# Patient Record
Sex: FEMALE | Race: WHITE | NOT HISPANIC OR LATINO | URBAN - METROPOLITAN AREA
[De-identification: names, ages, dates, MRNs, and addresses within clinical notes are randomized per-mention and may not be internally consistent; named-entity substitution may affect disease eponyms.]

---

## 2017-09-11 ASSESSMENT — MIFFLIN-ST. JEOR: SCORE: 876.41

## 2017-09-12 ENCOUNTER — SURGERY - HEALTHEAST (OUTPATIENT)
Dept: SURGERY | Facility: CLINIC | Age: 82
End: 2017-09-12

## 2017-09-12 ENCOUNTER — ANESTHESIA - HEALTHEAST (OUTPATIENT)
Dept: SURGERY | Facility: CLINIC | Age: 82
End: 2017-09-12

## 2017-09-12 ASSESSMENT — MIFFLIN-ST. JEOR: SCORE: 892.57

## 2017-09-13 ASSESSMENT — MIFFLIN-ST. JEOR: SCORE: 917.06

## 2017-09-19 ENCOUNTER — OFFICE VISIT - HEALTHEAST (OUTPATIENT)
Dept: GERIATRICS | Facility: CLINIC | Age: 82
End: 2017-09-19

## 2017-09-19 DIAGNOSIS — E66.9 DIABETES MELLITUS TYPE 2 IN OBESE: ICD-10-CM

## 2017-09-19 DIAGNOSIS — S72.001D CLOSED FRACTURE OF RIGHT HIP WITH ROUTINE HEALING: ICD-10-CM

## 2017-09-19 DIAGNOSIS — F01.50 VASCULAR DEMENTIA WITHOUT BEHAVIORAL DISTURBANCE (H): ICD-10-CM

## 2017-09-19 DIAGNOSIS — I48.0 PAROXYSMAL ATRIAL FIBRILLATION (H): ICD-10-CM

## 2017-09-19 DIAGNOSIS — H17.12 CORNEAL OPACITY, CENTRAL, LEFT: ICD-10-CM

## 2017-09-19 DIAGNOSIS — I25.2 H/O NON-ST ELEVATION MYOCARDIAL INFARCTION (NSTEMI): ICD-10-CM

## 2017-09-19 DIAGNOSIS — I44.7 LBBB (LEFT BUNDLE BRANCH BLOCK): ICD-10-CM

## 2017-09-19 DIAGNOSIS — Z96.641 HISTORY OF RIGHT HIP HEMIARTHROPLASTY: ICD-10-CM

## 2017-09-19 DIAGNOSIS — E11.69 DIABETES MELLITUS TYPE 2 IN OBESE: ICD-10-CM

## 2017-09-19 DIAGNOSIS — I50.22 CHRONIC SYSTOLIC HEART FAILURE (H): ICD-10-CM

## 2017-09-21 ENCOUNTER — OFFICE VISIT - HEALTHEAST (OUTPATIENT)
Dept: GERIATRICS | Facility: CLINIC | Age: 82
End: 2017-09-21

## 2017-09-21 DIAGNOSIS — F01.50 VASCULAR DEMENTIA WITHOUT BEHAVIORAL DISTURBANCE (H): ICD-10-CM

## 2017-09-21 DIAGNOSIS — S72.001D CLOSED FRACTURE OF RIGHT HIP WITH ROUTINE HEALING: ICD-10-CM

## 2017-09-21 DIAGNOSIS — E11.69 DIABETES MELLITUS TYPE 2 IN OBESE: ICD-10-CM

## 2017-09-21 DIAGNOSIS — R13.10 DYSPHAGIA: ICD-10-CM

## 2017-09-21 DIAGNOSIS — I50.9 CONGESTIVE HEART FAILURE (H): ICD-10-CM

## 2017-09-21 DIAGNOSIS — R91.1 LESION OF LUNG: ICD-10-CM

## 2017-09-21 DIAGNOSIS — I25.10 CAD (CORONARY ARTERY DISEASE): ICD-10-CM

## 2017-09-21 DIAGNOSIS — E66.9 DIABETES MELLITUS TYPE 2 IN OBESE: ICD-10-CM

## 2017-09-21 DIAGNOSIS — Z96.641 HISTORY OF RIGHT HIP HEMIARTHROPLASTY: ICD-10-CM

## 2017-09-21 DIAGNOSIS — I10 BENIGN ESSENTIAL HTN: ICD-10-CM

## 2017-09-21 DIAGNOSIS — N18.3 CRD (CHRONIC RENAL DISEASE), STAGE 3 (MODERATE): ICD-10-CM

## 2017-09-21 DIAGNOSIS — D64.9 ANEMIA: ICD-10-CM

## 2017-09-21 DIAGNOSIS — I48.0 PAROXYSMAL ATRIAL FIBRILLATION (H): ICD-10-CM

## 2017-09-26 ENCOUNTER — OFFICE VISIT - HEALTHEAST (OUTPATIENT)
Dept: GERIATRICS | Facility: CLINIC | Age: 82
End: 2017-09-26

## 2017-09-26 DIAGNOSIS — I25.2 H/O NON-ST ELEVATION MYOCARDIAL INFARCTION (NSTEMI): ICD-10-CM

## 2017-09-26 DIAGNOSIS — Z96.641 HISTORY OF RIGHT HIP HEMIARTHROPLASTY: ICD-10-CM

## 2017-09-26 DIAGNOSIS — I48.0 PAROXYSMAL ATRIAL FIBRILLATION (H): ICD-10-CM

## 2017-09-26 DIAGNOSIS — N18.4 CHRONIC KIDNEY DISEASE, STAGE IV (SEVERE) (H): ICD-10-CM

## 2017-09-26 DIAGNOSIS — I50.22 CHRONIC SYSTOLIC HEART FAILURE (H): ICD-10-CM

## 2017-09-26 DIAGNOSIS — S72.001D CLOSED FRACTURE OF RIGHT HIP WITH ROUTINE HEALING: ICD-10-CM

## 2017-09-26 DIAGNOSIS — E11.69 DIABETES MELLITUS TYPE 2 IN OBESE: ICD-10-CM

## 2017-09-26 DIAGNOSIS — I10 BENIGN ESSENTIAL HYPERTENSION: ICD-10-CM

## 2017-09-26 DIAGNOSIS — F01.50 VASCULAR DEMENTIA WITHOUT BEHAVIORAL DISTURBANCE (H): ICD-10-CM

## 2017-09-26 DIAGNOSIS — I44.7 LBBB (LEFT BUNDLE BRANCH BLOCK): ICD-10-CM

## 2017-09-26 DIAGNOSIS — E66.9 DIABETES MELLITUS TYPE 2 IN OBESE: ICD-10-CM

## 2017-09-28 ENCOUNTER — OFFICE VISIT - HEALTHEAST (OUTPATIENT)
Dept: GERIATRICS | Facility: CLINIC | Age: 82
End: 2017-09-28

## 2017-09-28 DIAGNOSIS — N18.3 CRD (CHRONIC RENAL DISEASE), STAGE 3 (MODERATE): ICD-10-CM

## 2017-09-28 DIAGNOSIS — E11.69 DIABETES MELLITUS TYPE 2 IN OBESE: ICD-10-CM

## 2017-09-28 DIAGNOSIS — E66.9 DIABETES MELLITUS TYPE 2 IN OBESE: ICD-10-CM

## 2017-09-28 DIAGNOSIS — I50.22 CHRONIC SYSTOLIC HEART FAILURE (H): ICD-10-CM

## 2017-09-28 DIAGNOSIS — I48.0 PAROXYSMAL ATRIAL FIBRILLATION (H): ICD-10-CM

## 2017-09-28 DIAGNOSIS — I25.10 CAD (CORONARY ARTERY DISEASE): ICD-10-CM

## 2017-09-28 DIAGNOSIS — S72.001D CLOSED FRACTURE OF RIGHT HIP WITH ROUTINE HEALING: ICD-10-CM

## 2017-09-28 DIAGNOSIS — Z96.641 HISTORY OF RIGHT HIP HEMIARTHROPLASTY: ICD-10-CM

## 2017-09-28 DIAGNOSIS — R13.10 DYSPHAGIA: ICD-10-CM

## 2017-09-28 DIAGNOSIS — D64.9 ANEMIA: ICD-10-CM

## 2017-09-28 DIAGNOSIS — F01.50 VASCULAR DEMENTIA WITHOUT BEHAVIORAL DISTURBANCE (H): ICD-10-CM

## 2017-09-28 DIAGNOSIS — I10 BENIGN ESSENTIAL HYPERTENSION: ICD-10-CM

## 2017-09-28 DIAGNOSIS — R91.1 LESION OF LUNG: ICD-10-CM

## 2017-10-02 ENCOUNTER — AMBULATORY - HEALTHEAST (OUTPATIENT)
Dept: GERIATRICS | Facility: CLINIC | Age: 82
End: 2017-10-02

## 2021-05-25 ENCOUNTER — RECORDS - HEALTHEAST (OUTPATIENT)
Dept: ADMINISTRATIVE | Facility: CLINIC | Age: 86
End: 2021-05-25

## 2021-05-26 ENCOUNTER — RECORDS - HEALTHEAST (OUTPATIENT)
Dept: ADMINISTRATIVE | Facility: CLINIC | Age: 86
End: 2021-05-26

## 2021-05-27 ENCOUNTER — RECORDS - HEALTHEAST (OUTPATIENT)
Dept: ADMINISTRATIVE | Facility: CLINIC | Age: 86
End: 2021-05-27

## 2021-05-28 ENCOUNTER — RECORDS - HEALTHEAST (OUTPATIENT)
Dept: ADMINISTRATIVE | Facility: CLINIC | Age: 86
End: 2021-05-28

## 2021-05-29 ENCOUNTER — RECORDS - HEALTHEAST (OUTPATIENT)
Dept: ADMINISTRATIVE | Facility: CLINIC | Age: 86
End: 2021-05-29

## 2021-05-30 ENCOUNTER — RECORDS - HEALTHEAST (OUTPATIENT)
Dept: ADMINISTRATIVE | Facility: CLINIC | Age: 86
End: 2021-05-30

## 2021-05-31 ENCOUNTER — RECORDS - HEALTHEAST (OUTPATIENT)
Dept: ADMINISTRATIVE | Facility: CLINIC | Age: 86
End: 2021-05-31

## 2021-05-31 VITALS — WEIGHT: 130.1 LBS | BODY MASS INDEX: 25.41 KG/M2

## 2021-05-31 VITALS — BODY MASS INDEX: 26 KG/M2 | WEIGHT: 132.4 LBS | HEIGHT: 60 IN

## 2021-05-31 VITALS — BODY MASS INDEX: 24.94 KG/M2 | WEIGHT: 127.7 LBS

## 2021-06-01 ENCOUNTER — RECORDS - HEALTHEAST (OUTPATIENT)
Dept: ADMINISTRATIVE | Facility: CLINIC | Age: 86
End: 2021-06-01

## 2021-06-02 ENCOUNTER — RECORDS - HEALTHEAST (OUTPATIENT)
Dept: ADMINISTRATIVE | Facility: CLINIC | Age: 86
End: 2021-06-02

## 2021-06-03 ENCOUNTER — RECORDS - HEALTHEAST (OUTPATIENT)
Dept: ADMINISTRATIVE | Facility: CLINIC | Age: 86
End: 2021-06-03

## 2021-06-09 ENCOUNTER — RECORDS - HEALTHEAST (OUTPATIENT)
Dept: ADMINISTRATIVE | Facility: CLINIC | Age: 86
End: 2021-06-09

## 2021-06-12 NOTE — ANESTHESIA PROCEDURE NOTES
Spinal Block    Patient location during procedure: OR  Start time: 9/12/2017 7:55 PM  End time: 9/12/2017 7:58 PM    Staffing:  Performing  Anesthesiologist: TSACIA RAYA    Preanesthetic Checklist  Completed: patient identified, risks, benefits, and alternatives discussed, timeout performed, consent obtained, at patient's request, airway assessed, oxygen available, suction available, emergency drugs available and hand hygiene performed  Spinal Block  Patient position: right lateral decubitus  Prep: ChloraPrep  Patient monitoring: continuous pulse ox  Approach: midline  Location: L3-4  Injection technique: single-shot  Needle type: pencil-tip   Needle gauge: 24 G    Assessment  Sensory level: T10

## 2021-06-12 NOTE — ANESTHESIA POSTPROCEDURE EVALUATION
Patient: Remedios Nazario  RIGHT HIP BIPOLAR HEMIARTHROPLASTY  Anesthesia type: spinal    Patient location: PACU  Last vitals:   Vitals:    09/12/17 2250   BP: 166/83   Pulse: 93   Resp: 16   Temp: 36.6  C (97.8  F)   SpO2: 95%     Post vital signs: stable  Level of consciousness: awake and responds to simple questions  Post-anesthesia pain: pain controlled  Post-anesthesia nausea and vomiting: no  Pulmonary: unassisted, return to baseline  Cardiovascular: stable and blood pressure at baseline  Hydration: adequate  Anesthetic events: no    QCDR Measures:  ASA# 11 - Cecilia-op Cardiac Arrest: ASA11B - Patient did NOT experience unanticipated cardiac arrest  ASA# 12 - Cecilia-op Mortality Rate: ASA12B - Patient did NOT die  ASA# 13 - PACU Re-Intubation Rate: ASA13B - Patient did NOT require a new airway mgmt  ASA# 10 - Composite Anes Safety: ASA10A - No serious adverse event    Additional Notes:

## 2021-06-12 NOTE — ANESTHESIA CARE TRANSFER NOTE
Last vitals:   Vitals:    09/12/17 1810   BP: 106/56   Pulse: 82   Resp: 18   Temp: 36.9  C (98.4  F)   SpO2: 95%     Patient's level of consciousness is drowsy  Spontaneous respirations: yes  Maintains airway independently: yes  Dentition unchanged: yes  Oropharynx: oropharynx clear of all foreign objects    QCDR Measures:  ASA# 20 - Surgical No Data Recorded  PQRS# 430 - Adult PONV Prevention: 4558F-8P - Pt did NOT receive => 2 anti-emetic agents  ASA# 8 - Peds PONV Prevention: NA - Not pediatric patient, not GA or 2 or more risk factors NOT present  PQRS# 424 - Cecilia-op Temp Management: 4559F - At least one body temp DOCUMENTED => 35.5C or 95.9F within required timeframe  PQRS# 426 - PACU Transfer Protocol: - Transfer of care checklist used  ASA# 14 - Acute Post-op Pain: ASA14B - Patient did NOT experience pain >= 7 out of 10

## 2021-06-12 NOTE — ANESTHESIA PREPROCEDURE EVALUATION
Anesthesia Evaluation      Patient summary reviewed   History of anesthetic complications     Airway   Neck ROM: full   Pulmonary - normal exam   (+) pneumonia,                          Cardiovascular - normal exam  (+) CAD, dysrhythmias, CHF, ,      Neuro/Psych    (+) CVA ,     Endo/Other    (+) diabetes mellitus,      GI/Hepatic/Renal    (+)   chronic renal disease,           Dental    (+) lower dentures and upper dentures                       Anesthesia Plan  Planned anesthetic: spinal    ASA 3     Anesthetic plan and risks discussed with: patient    Post-op plan: routine recovery

## 2021-06-13 NOTE — PROGRESS NOTES
[facility]    DATE: 2017  NAME: Remedios Nazario  : 2/10/1926           MR# [id number]     CODE STATUS:  DNR  VISIT TYPE: Admission  FACILITY: [facility] [[id number]]    ROOM: 416  PRIMARY CARE PROVIDER: Loni Canada PA-C Phone: [phone] Fax:[phone]    History of Present Illness:   Remedios Nazario is a 89 y.o. female with With a right hip surgical neck fracture as a result of an unobserved fall in memory care. She had subsequent 17 right hemiarthroplasty. Records are completed from nurses notes as patient is very unclear as to any history regarding falls or breaks or  Even previous residence    Past Medical History:  Past Medical History:   Diagnosis Date     Abdominal aortic aneurysm      Anemia      Atrophic kidney     Left     Back fracture 2015     Bladder cancer     with ileostomy     CAD (coronary artery disease)      Carotid stenosis     and vertebrobasilar insufficiency     CHF (congestive heart failure)      CKD (chronic kidney disease)      Corneal opacity, central, left      CVA (cerebral vascular accident)      Dementia      Diabetes mellitus      Dysphagia     mild     Encephalopathy      Fracture     L2 decompression     Glaucoma      Hyperlipidemia      Hypertension      Left bundle branch block     chronic     MRSA (methicillin resistant Staphylococcus aureus)      NSTEMI (non-ST elevated myocardial infarction)      Osteomyelitis     and diskitis and C5-C6     Pancreatitis      Seizure disorder      Shingles 2/7/15     Thoracic aortic aneurysm      UTI (urinary tract infection)        Past Surgical History:  Past Surgical History:   Procedure Laterality Date     CHOLECYSTECTOMY       CORONARY ARTERY BYPASS GRAFT       HYSTERECTOMY       left TKA       IA PARTIAL HIP REPLACEMENT Right 2017    Procedure: RIGHT HIP BIPOLAR HEMIARTHROPLASTY;  Surgeon: Wesly Finch MD;  Location: [facility];  Service:  Orthopedics     SPINAL FUSION       TONSILLECTOMY         Allergies:  Allergies   Allergen Reactions     Codeine Itching     Darvon [Propoxyphene] Itching     Demerol [Meperidine] Itching     Dilaudid [Hydromorphone]      From [facility] records     Doxycycline Calcium      Please avoid tetracycines in this patient as we believe that this probably caused pancreatitis      Oxycodone      hallucinations       Social History:  Social History     Social History     Marital status:      Spouse name: N/A     Number of children: N/A     Years of education: N/A     Occupational History     Not on file.     Social History Main Topics     Smoking status: Former Smoker     Quit date: 9/21/1996     Smokeless tobacco: Not on file     Alcohol use No     Drug use: No     Sexual activity: No     Other Topics Concern     Not on file     Social History Narrative    Lives in memory care unit.  She doesn't remember where she livs but is sure it is at home   9/2017       Family History:  Family History   Problem Relation Age of Onset     Diabetes Mother      Heart disease Mother        Current Medications:  Current Outpatient Prescriptions   Medication Sig     acetaminophen (TYLENOL) 500 MG tablet Take 2 tablets (1,000 mg total) by mouth 3 (three) times a day.     atorvastatin (LIPITOR) 40 MG tablet Take 1 tablet (40 mg total) by mouth bedtime.     brimonidine-timolol (COMBIGAN) 0.2-0.5 % ophthalmic solution Administer 1 drop to the right eye 2 (two) times a day.     bumetanide (BUMEX) 0.5 MG tablet Take 0.5 mg by mouth daily.     calcium-vitamin D 500 mg(1,250mg) -200 unit per tablet Take 1 tablet by mouth 2 (two) times a day with meals.      clopidogrel (PLAVIX) 75 mg tablet Take 75 mg by mouth at bedtime.      donepezil (ARICEPT) 5 MG tablet Take 5 mg by mouth bedtime.     ferrous sulfate 325 (65 FE) MG tablet Take 1 tablet by mouth daily with breakfast.     glimepiride (AMARYL) 1 MG tablet Take 1 mg by mouth daily  before breakfast.     magnesium hydroxide (MAGNESIUM HYDROXIDE) 400 mg/5 mL Susp suspension Take 30 mL by mouth at bedtime.      meclizine (ANTIVERT) 25 mg tablet Take 25 mg by mouth 3 (three) times a day as needed.      melatonin 3 mg Tab tablet Take 3 mg by mouth at bedtime.      metoprolol tartrate (LOPRESSOR) 25 MG tablet Take 25 mg by mouth 2 (two) times a day.     nystatin (MYCOSTATIN) powder Apply 1 application topically as needed (with ostomy bag changes).     OLANZapine (ZYPREXA) 2.5 MG tablet Take 2.5 mg by mouth bedtime.      omeprazole (PRILOSEC) 20 MG capsule Take 20 mg by mouth daily before breakfast.     polyethylene glycol (MIRALAX) 17 gram packet Take 1 packet (17 g total) by mouth daily.     polyvinyl alcohol (LIQUIFILM TEARS) 1.4 % ophthalmic solution Administer 1-2 drops to both eyes 2 (two) times a day.      senna (SENOKOT) 8.6 mg tablet Take 1 tablet by mouth at bedtime.      sodium chloride (OCEAN) 0.65 % nasal spray 1-2 sprays into each nostril 4 (four) times a day.      travoprost (TRAVATAN Z) 0.004 % Drop ophthalmic drops Administer 1 drop to the right eye at bedtime.      trimethoprim (TRIMPEX) 100 mg tablet Take 100 mg by mouth every evening.     venlafaxine (EFFEXOR-XR) 75 MG 24 hr capsule Take 75 mg by mouth daily.       Review of Systems:  History obtained from chart review and the patient Although patient is very inadequate as historian and primary relapse and records as necessary  General ROS: positive for  - fatigue  negative for - chills or fever  Psychological ROS: positive for - memory difficulties  Ophthalmic ROS: negative for - decreased vision, loss of vision or In her right good eye although the left has a dense corneal scar prohibiting vision centrally  ENT ROS: negative for - nasal discharge or sore throat  Breast ROS: negative for breast lumps  Respiratory ROS: no cough, shortness of breath, or wheezing  Cardiovascular ROS: no chest pain or dyspnea on  exertion  Gastrointestinal ROS: no abdominal pain, change in bowel habits, or black or bloody stools  Genito-Urinary ROS: no dysuria, trouble voiding, or hematuria  Musculoskeletal ROS: When I ask you to move it she notes her hips move the same on both sides not really willing to acknowledge pain even in the area of the hemiarthroplasty  Neurological ROS: positive for - memory loss  Dermatological ROS: negative       Physical Examination:  /75  Pulse 79  Temp 97.5  F (36.4  C)  Resp 18  SpO2 95%  General appearance: alert, appears stated age, cooperative, distracted, fatigued, no distress, mildly obese and slowed mentation  Head: Normocephalic, without obvious abnormality, atraumatic  Eyes:Left cornea is opaque with scar right cornea intact  Ears: normal TM's and external ear canals both ears  Nose: Nares normal. Septum midline. Mucosa normal. No drainage or sinus tenderness.  Throat: lips, mucosa, and tongue normal; teeth and gums normal  Neck: no adenopathy, no carotid bruit, no JVD, supple, symmetrical, trachea midline and thyroid not enlarged, symmetric, no tenderness/mass/nodules  Back: symmetric, no curvature. ROM normal. No CVA tenderness.  Lungs: clear to auscultation bilaterally  Breasts: normal appearance, no masses or tenderness  Heart: regular rate and rhythm, S1, S2 normal, no murmur, click, rub or gallop  Abdomen: soft, non-tender; bowel sounds normal; no masses,  no organomegaly  Extremities: she shows fairly normal at least symmetrical function of both hips despite slight swelling in the right hip and  lower extremity  Pulses: 2+ and symmetric  Skin: Skin color, texture, turgor normal. No rashes or lesions  Neurologic: Mental status: oriented only to person but verbally cooperative and pleasant and well spoken  Motor:on this exam symmetrical strength and tone despite known right hip surgery       Impression:  Remedios Nazario is a 89 y.o. female with Right hip hemiarthroplasty 9/12/17 after  fall    1. Closed fracture of right hip with routine healing     2. History of right hip hemiarthroplasty     3. Diabetes mellitus type 2 in obese     4. Chronic systolic heart failure     5. Vascular dementia without behavioral disturbance     6. Paroxysmal atrial fibrillation     7. H/O non-ST elevation myocardial infarction (NSTEMI)     8. LBBB (left bundle branch block)     9. Corneal opacity, central, left           Plan: Work with her for maximum rehabilitation to allow her to return to her memory care    Electronically signed by: Shane Hawkins Sr., MD

## 2021-06-13 NOTE — PROGRESS NOTES
[facility]    DATE: 2017    NAME: Remedios Nazario  : 2/10/1926           MR# [id number]     CODE STATUS:  DNR      VISIT TYPE: Problem   FACILITY: [facility] [[id number]]    ROOM: 416    PRIMARY CARE PROVIDER: Loni Canada PA-C Phone: [phone] Fax:[phone]    History of Present Illness:   Remedios Nazario is a 89 y.o. female with Advanced dementia and a 17 right hip hemiarthroplasty without complications.. As usual history is not available for patient but nursing service and therapy report she's making  Some progress    Past Medical History:  Past Medical History:   Diagnosis Date     Abdominal aortic aneurysm      Anemia      Atrophic kidney     Left     Back fracture 2015     Bladder cancer     with ileostomy     CAD (coronary artery disease)      Carotid stenosis     and vertebrobasilar insufficiency     CHF (congestive heart failure)      CKD (chronic kidney disease)      Corneal opacity, central, left      CVA (cerebral vascular accident)      Dementia      Diabetes mellitus      Dysphagia     mild     Encephalopathy      Fracture     L2 decompression     Glaucoma      Hyperlipidemia      Hypertension      Left bundle branch block     chronic     MRSA (methicillin resistant Staphylococcus aureus)      NSTEMI (non-ST elevated myocardial infarction)      Osteomyelitis     and diskitis and C5-C6     Pancreatitis      Seizure disorder      Shingles 2/7/15     Thoracic aortic aneurysm      UTI (urinary tract infection)        Allergies:  Allergies   Allergen Reactions     Codeine Itching     Darvon [Propoxyphene] Itching     Demerol [Meperidine] Itching     Dilaudid [Hydromorphone]      From [facility] records     Doxycycline Calcium      Please avoid tetracycines in this patient as we believe that this probably caused pancreatitis      Oxycodone      hallucinations       Current Medications:  Current Outpatient Prescriptions    Medication Sig     acetaminophen (TYLENOL) 500 MG tablet Take 2 tablets (1,000 mg total) by mouth 3 (three) times a day.     atorvastatin (LIPITOR) 40 MG tablet Take 1 tablet (40 mg total) by mouth bedtime.     brimonidine-timolol (COMBIGAN) 0.2-0.5 % ophthalmic solution Administer 1 drop to the right eye 2 (two) times a day.     bumetanide (BUMEX) 0.5 MG tablet Take 0.5 mg by mouth daily.     calcium-vitamin D 500 mg(1,250mg) -200 unit per tablet Take 1 tablet by mouth 2 (two) times a day with meals.      clopidogrel (PLAVIX) 75 mg tablet Take 75 mg by mouth at bedtime.      donepezil (ARICEPT) 5 MG tablet Take 5 mg by mouth bedtime.     ferrous sulfate 325 (65 FE) MG tablet Take 1 tablet by mouth daily with breakfast.     glimepiride (AMARYL) 1 MG tablet Take 1 mg by mouth daily before breakfast.     magnesium hydroxide (MAGNESIUM HYDROXIDE) 400 mg/5 mL Susp suspension Take 30 mL by mouth at bedtime.      meclizine (ANTIVERT) 25 mg tablet Take 25 mg by mouth 3 (three) times a day as needed.      melatonin 3 mg Tab tablet Take 3 mg by mouth at bedtime.      metoprolol tartrate (LOPRESSOR) 25 MG tablet Take 25 mg by mouth 2 (two) times a day.     nystatin (MYCOSTATIN) powder Apply 1 application topically as needed (with ostomy bag changes).     OLANZapine (ZYPREXA) 2.5 MG tablet Take 2.5 mg by mouth bedtime.      omeprazole (PRILOSEC) 20 MG capsule Take 20 mg by mouth daily before breakfast.     polyethylene glycol (MIRALAX) 17 gram packet Take 1 packet (17 g total) by mouth daily.     polyvinyl alcohol (LIQUIFILM TEARS) 1.4 % ophthalmic solution Administer 1-2 drops to both eyes 2 (two) times a day.      senna (SENOKOT) 8.6 mg tablet Take 1 tablet by mouth at bedtime.      sodium chloride (OCEAN) 0.65 % nasal spray 1-2 sprays into each nostril 4 (four) times a day.      travoprost (TRAVATAN Z) 0.004 % Drop ophthalmic drops Administer 1 drop to the right eye at bedtime.      trimethoprim (TRIMPEX) 100 mg tablet  Take 100 mg by mouth every evening.     venlafaxine (EFFEXOR-XR) 75 MG 24 hr capsule Take 75 mg by mouth daily.       Review of Systems:  History obtained from chart review and unobtainable from patient due to mental status  Respiratory ROS: no cough, shortness of breath, or wheezing  Cardiovascular ROS: no chest pain or dyspnea on exertion  Gastrointestinal ROS: no abdominal pain, change in bowel habits, or black or bloody stools  Genito-Urinary ROS: no dysuria, trouble voiding, or hematuria  Musculoskeletal ROS: When she remembers or tries to use her right hip she notices discomfort  Neurological ROS: no TIA or stroke symptoms  Dermatological ROS: hhe denies active skin lesions and nursing service report         Laboratory:  No results for input(s): INR in the last 72 hours.       Physical Examination:  /73  Pulse 80  Temp 96.8  F (36  C)  Resp 22  Wt 130 lb 1.6 oz (59 kg)  SpO2 95%  BMI 25.41 kg/m2  General appearance: alert, appears stated age, cachectic, cooperative, distracted, no distress and slowed mentation  Neck: no adenopathy, no carotid bruit, no JVD, supple, symmetrical, trachea midline and thyroid not enlarged, symmetric, no tenderness/mass/nodules  Lungs: clear to auscultation bilaterally  Heart: regular rate and rhythm, S1, S2 normal, no murmur, click, rub or gallop  Abdomen: soft, non-tender; bowel sounds normal; no masses,  no organomegaly  Extremities: limitation swelling and decreased range of motion in the right hip would be expected  Pulses: 2+ and symmetric  Skin: Skin color, texture, turgor normal. No rashes or lesions Nursing service reports clean dry lesions skin related to surgical intervention       Impression:  Remedios Nazario is a 89 y.o. female with Right hip hemiarthroplasty and dementia    1. Closed fracture of right hip with routine healing     2. History of right hip hemiarthroplasty     3. Diabetes mellitus type 2 in obese     4. Chronic systolic heart failure     5.  Vascular dementia without behavioral disturbance     6. Paroxysmal atrial fibrillation     7. H/O non-ST elevation myocardial infarction (NSTEMI)     8. LBBB (left bundle branch block)     9. Benign essential hypertension     10. Chronic kidney disease, stage IV (severe)         Plan: Continue encourage physical rehabilitation I think her cognitive status unfortunate limits her ability to regain full function but she is not the slightest bit depressed about that to be very happy in pleased with Whatever happens    Electronically signed by: Shane Hawkins Sr., MD

## 2021-06-13 NOTE — PROGRESS NOTES
Code Status:  DNR  Visit Type: No chief complaint on file.     Facility:  [facility]         Facility Type: SNF (Skilled Nursing Facility, TCU)    History of Present Illness: Remedios Nazario is a 89 y.o. female one thing today for follow-up on the TCU.  Patient denied fall but was found to have a right hip fracture.  She underwent right hip hemiarthroplasty on 9/12/2017.  She was treated for UTI during hospitalization.  No further complaints.  Pain well-controlled in the right hip with Tylenol.  Recent increased shortness of breath and lower extremity edema.  Chest x-ray was obtained which showed probable atelectasis versus pleural effusion.  Review of comparison chest x-ray in hospital which showed negative exam during hospitalization.  Nursing staff report increased weight gain of 4 pounds.  Now 1+ lower extremity edema.  She has underlying history of congestive heart failure.  She continues on Bumex.  Underlying advanced dementia so she is a poor historian on visit.  Past medical history also includes diabetes diet controlled, left bundle branch block, atrial fib, coronary artery disease, kidney disease stage III-IV.  Her creatinine is at a baseline of 1.5.  Past medical history also includes hypertension, chronic constipation and CVA.  She continues in therapy. Her daughter is very concerned over pt condition.         Active Ambulatory Problems     Diagnosis Date Noted     Pancreatitis 12/02/2014     Shingles rash 02/07/2015     Chronic kidney disease, stage IV (severe) 09/21/2016     Benign essential hypertension 09/23/2016     Chronic systolic heart failure 09/24/2016     Acute respiratory failure with hypoxia      H/O non-ST elevation myocardial infarction (NSTEMI)      LBBB (left bundle branch block)      Acute systolic CHF (congestive heart failure), NYHA class 3      Paroxysmal atrial fibrillation      Contrast dye induced nephropathy      Palliative care encounter      Dysphagia       Closed fracture of right hip with routine healing 09/11/2017     Dementia      Diabetes mellitus type 2 in obese      Corneal opacity, central, left      Resolved Ambulatory Problems     Diagnosis Date Noted     No Resolved Ambulatory Problems     Past Medical History:   Diagnosis Date     Abdominal aortic aneurysm      Anemia      Atrophic kidney      Back fracture 1/2015     Bladder cancer      CAD (coronary artery disease)      Carotid stenosis      CHF (congestive heart failure)      CKD (chronic kidney disease)      Corneal opacity, central, left      CVA (cerebral vascular accident)      Dementia      Diabetes mellitus      Dysphagia      Encephalopathy      Fracture      Glaucoma      Hyperlipidemia      Hypertension      Left bundle branch block      MRSA (methicillin resistant Staphylococcus aureus)      NSTEMI (non-ST elevated myocardial infarction)      Osteomyelitis      Pancreatitis      Seizure disorder      Shingles 2/7/15     Thoracic aortic aneurysm      UTI (urinary tract infection)        Current Outpatient Prescriptions   Medication Sig     acetaminophen (TYLENOL) 500 MG tablet Take 2 tablets (1,000 mg total) by mouth 3 (three) times a day.     atorvastatin (LIPITOR) 40 MG tablet Take 1 tablet (40 mg total) by mouth bedtime.     brimonidine-timolol (COMBIGAN) 0.2-0.5 % ophthalmic solution Administer 1 drop to the right eye 2 (two) times a day.     bumetanide (BUMEX) 0.5 MG tablet Take 0.5 mg by mouth daily.     calcium-vitamin D 500 mg(1,250mg) -200 unit per tablet Take 1 tablet by mouth 2 (two) times a day with meals.      clopidogrel (PLAVIX) 75 mg tablet Take 75 mg by mouth at bedtime.      donepezil (ARICEPT) 5 MG tablet Take 5 mg by mouth bedtime.     ferrous sulfate 325 (65 FE) MG tablet Take 1 tablet by mouth daily with breakfast.     glimepiride (AMARYL) 1 MG tablet Take 1 mg by mouth daily before breakfast.     magnesium hydroxide (MAGNESIUM HYDROXIDE) 400 mg/5 mL Susp suspension  Take 30 mL by mouth at bedtime.      meclizine (ANTIVERT) 25 mg tablet Take 25 mg by mouth 3 (three) times a day as needed.      melatonin 3 mg Tab tablet Take 3 mg by mouth at bedtime.      metoprolol tartrate (LOPRESSOR) 25 MG tablet Take 25 mg by mouth 2 (two) times a day.     nystatin (MYCOSTATIN) powder Apply 1 application topically as needed (with ostomy bag changes).     OLANZapine (ZYPREXA) 2.5 MG tablet Take 2.5 mg by mouth bedtime.      omeprazole (PRILOSEC) 20 MG capsule Take 20 mg by mouth daily before breakfast.     polyethylene glycol (MIRALAX) 17 gram packet Take 1 packet (17 g total) by mouth daily.     polyvinyl alcohol (LIQUIFILM TEARS) 1.4 % ophthalmic solution Administer 1-2 drops to both eyes 2 (two) times a day.      senna (SENOKOT) 8.6 mg tablet Take 1 tablet by mouth at bedtime.      sodium chloride (OCEAN) 0.65 % nasal spray 1-2 sprays into each nostril 4 (four) times a day.      travoprost (TRAVATAN Z) 0.004 % Drop ophthalmic drops Administer 1 drop to the right eye at bedtime.      trimethoprim (TRIMPEX) 100 mg tablet Take 100 mg by mouth every evening.     venlafaxine (EFFEXOR-XR) 75 MG 24 hr capsule Take 75 mg by mouth daily.     Allergies   Allergen Reactions     Codeine Itching     Darvon [Propoxyphene] Itching     Demerol [Meperidine] Itching     Dilaudid [Hydromorphone]      From [facility] records     Doxycycline Calcium      Please avoid tetracycines in this patient as we believe that this probably caused pancreatitis      Oxycodone      hallucinations         Review of Systems   Pt very poor historian due to advanced dementia. Most of information obtained from nursing staff and records. No fevers or chills. No headache, lightheadedness or dizziness. Increased SOB with increased respirations, increased edema, cough, no chest pains or palpitations. Appetite is fair. No nausea, vomiting, constipation or diarrhea. No dysuria, frequency, burning or pain with urination.        Physical Exam   PHYSICAL EXAMINATION:  Vital signs: BP (!) 145/91  Pulse 79  Temp 99.4  F (37.4  C)  Resp 18  SpO2 94%  General: Awake, Alert,  appropriately, follows simple commands, conversant  HEENT:PERRLA, Pink conjunctiva, anicteric sclerae, moist oral mucosa  NECK: Supple, without any lymphadenopathy, or masses  CVS:  Irregular, Irregular  LUNG: Dyspnea with conversation. Crackles in both lower lobes greater on the left. Occasional wet cough on exam.   BACK: No kyphosis of the thoracic spine  ABDOMEN: Soft, nontender to palpation, with positive bowel sounds  EXTREMITIES: Moves both upper and lower extremities with diffuse weakness, some limitation on the right, 1+ pedal edema, no calf tenderness  SKIN: Warm and dry, no rashes or erythema noted  NEUROLOGIC: Intact, pulses palpable  PSYCHIATRIC: Advanced cognitive impairment.             Labs:  All labs reviewed in the nursing home record.    Assessment/Plan:  1. Closed fracture of right hip with routine healing     2. History of right hip hemiarthroplasty     3. Congestive heart failure     4. Vascular dementia without behavioral disturbance     5. Paroxysmal atrial fibrillation     6. CRD (chronic renal disease), stage 3 (moderate)     7. Diabetes mellitus type 2 in obese     8. Benign essential HTN     9. Lesion of lung     10. CAD (coronary artery disease)     11. Anemia     12. Dysphagia       Patient with recent right hip fracture status post right hemiarthroplasty.  Pain well-controlled with Tylenol.  She continues in therapy.  Recent increased shortness of breath weight gain and lower extremity edema.  Underlying history of CHF.  Recent chest x-ray showed atelectasis versus pleural effusion.  She continues on Bumex 0.5 mg daily and I will increase this to 1 mg daily ×3 days.  Continue daily weights.  Follow-up BMP and BNP on Tuesday.  Underlying chronic kidney disease stage III.  Baseline creatinine 1.5.  We will have to diurese gently.   Diabetes diet controlled.  Blood pressure somewhat elevated but hopes to increase Bumex will come down.  Coronary artery disease.  No chest pain.  She does continue on aspirin for DVT prophylaxis.  Also on Plavix.  Anemia.  Hemoglobin 9.  Recent drop from 913 was originally 12 been down to 11 and 10 mL 9.  We will follow-up with guaiacs.  Patient with underlying advanced dementia.  Very poor historian.  History of dysphagia.  Continues in speech therapy.          60 minutes spent of which greater than 50% was face to face communication with the patient and daughter about above plan of care    Electronically signed by: Tena Talavera, CNP

## 2021-06-13 NOTE — PROGRESS NOTES
Code Status:  DNR  Visit Type: Problem Visit     Facility:  [facility] [[id number]]         Facility Type: SNF (Skilled Nursing Facility, TCU)    History of Present Illness: Remedios Nazario is a 89 y.o. female who I am seeing today  for follow-up on the TCU.  Patient denied fall but was found to have a right hip fracture.  She underwent right hip hemiarthroplasty on 9/12/2017.  She was treated for UTI during hospitalization.  No further complaints.  Pain well-controlled in the right hip with Tylenol. Underlying CHF.  A week ago a chest x-ray was obtained which showed probable atelectasis versus pleural effusion. She was treated with additional Bumex last week. Today she continues with crackles in BLL. She is up 2 lbs. No LE edema.   Past medical history also includes diabetes diet controlled, left bundle branch block, atrial fib, coronary artery disease, kidney disease stage III-IV.         Active Ambulatory Problems     Diagnosis Date Noted     Pancreatitis 12/02/2014     Shingles rash 02/07/2015     Chronic kidney disease, stage IV (severe) 09/21/2016     Benign essential hypertension 09/23/2016     Chronic systolic heart failure 09/24/2016     Acute respiratory failure with hypoxia      H/O non-ST elevation myocardial infarction (NSTEMI)      LBBB (left bundle branch block)      Acute systolic CHF (congestive heart failure), NYHA class 3      Paroxysmal atrial fibrillation      Contrast dye induced nephropathy      Palliative care encounter      Dysphagia      Closed fracture of right hip with routine healing 09/11/2017     Dementia      Diabetes mellitus type 2 in obese      Corneal opacity, central, left      History of right hip hemiarthroplasty 09/26/2017     Resolved Ambulatory Problems     Diagnosis Date Noted     No Resolved Ambulatory Problems     Past Medical History:   Diagnosis Date     Abdominal aortic aneurysm      Anemia      Atrophic kidney      Back fracture 1/2015      Bladder cancer      CAD (coronary artery disease)      Carotid stenosis      CHF (congestive heart failure)      CKD (chronic kidney disease)      Corneal opacity, central, left      CVA (cerebral vascular accident)      Dementia      Diabetes mellitus      Dysphagia      Encephalopathy      Fracture      Glaucoma      Hyperlipidemia      Hypertension      Left bundle branch block      MRSA (methicillin resistant Staphylococcus aureus)      NSTEMI (non-ST elevated myocardial infarction)      Osteomyelitis      Pancreatitis      Seizure disorder      Shingles 2/7/15     Thoracic aortic aneurysm      UTI (urinary tract infection)        Current Outpatient Prescriptions   Medication Sig     acetaminophen (TYLENOL) 500 MG tablet Take 2 tablets (1,000 mg total) by mouth 3 (three) times a day.     atorvastatin (LIPITOR) 40 MG tablet Take 1 tablet (40 mg total) by mouth bedtime.     brimonidine-timolol (COMBIGAN) 0.2-0.5 % ophthalmic solution Administer 1 drop to the right eye 2 (two) times a day.     bumetanide (BUMEX) 0.5 MG tablet Take 0.5 mg by mouth daily.     calcium-vitamin D 500 mg(1,250mg) -200 unit per tablet Take 1 tablet by mouth 2 (two) times a day with meals.      clopidogrel (PLAVIX) 75 mg tablet Take 75 mg by mouth at bedtime.      donepezil (ARICEPT) 5 MG tablet Take 5 mg by mouth bedtime.     ferrous sulfate 325 (65 FE) MG tablet Take 1 tablet by mouth daily with breakfast.     glimepiride (AMARYL) 1 MG tablet Take 1 mg by mouth daily before breakfast.     magnesium hydroxide (MAGNESIUM HYDROXIDE) 400 mg/5 mL Susp suspension Take 30 mL by mouth at bedtime.      meclizine (ANTIVERT) 25 mg tablet Take 25 mg by mouth 3 (three) times a day as needed.      melatonin 3 mg Tab tablet Take 3 mg by mouth at bedtime.      metoprolol tartrate (LOPRESSOR) 25 MG tablet Take 25 mg by mouth 2 (two) times a day.     nystatin (MYCOSTATIN) powder Apply 1 application topically as needed (with ostomy bag changes).      OLANZapine (ZYPREXA) 2.5 MG tablet Take 2.5 mg by mouth bedtime.      omeprazole (PRILOSEC) 20 MG capsule Take 20 mg by mouth daily before breakfast.     polyethylene glycol (MIRALAX) 17 gram packet Take 1 packet (17 g total) by mouth daily.     polyvinyl alcohol (LIQUIFILM TEARS) 1.4 % ophthalmic solution Administer 1-2 drops to both eyes 2 (two) times a day.      senna (SENOKOT) 8.6 mg tablet Take 1 tablet by mouth at bedtime.      sodium chloride (OCEAN) 0.65 % nasal spray 1-2 sprays into each nostril 4 (four) times a day.      travoprost (TRAVATAN Z) 0.004 % Drop ophthalmic drops Administer 1 drop to the right eye at bedtime.      trimethoprim (TRIMPEX) 100 mg tablet Take 100 mg by mouth every evening.     venlafaxine (EFFEXOR-XR) 75 MG 24 hr capsule Take 75 mg by mouth daily.     Allergies   Allergen Reactions     Codeine Itching     Darvon [Propoxyphene] Itching     Demerol [Meperidine] Itching     Dilaudid [Hydromorphone]      From [facility] records     Doxycycline Calcium      Please avoid tetracycines in this patient as we believe that this probably caused pancreatitis      Oxycodone      hallucinations         Review of Systems   Pt very poor historian due to advanced dementia. Most of information obtained from nursing staff and records. No fevers or chills. No headache, lightheadedness or dizziness. Increased SOB with increased respirations, increased edema, cough, no chest pains or palpitations. Appetite is fair. No nausea, vomiting, constipation or diarrhea. No dysuria, frequency, burning or pain with urination.       Physical Exam   PHYSICAL EXAMINATION:  Vital signs: /73  Pulse 71  Temp 98  F (36.7  C)  Resp 24  Wt 127 lb 11.2 oz (57.9 kg)  SpO2 98%  BMI 24.94 kg/m2  General: Awake, Alert,  appropriately, follows simple commands, conversant  HEENT:PERRLA, Pink conjunctiva, anicteric sclerae, moist oral mucosa  NECK: Supple, without any lymphadenopathy, or masses  CVS:   Irregular, Irregular  LUNG: Dyspnea with conversation. Crackles in both lower lobes. No cough.   BACK: No kyphosis of the thoracic spine  ABDOMEN: Soft, nontender to palpation, with positive bowel sounds  EXTREMITIES: Moves both upper and lower extremities with diffuse weakness, some limitation on the right, no  pedal edema, no calf tenderness  SKIN: Warm and dry, no rashes or erythema noted  NEUROLOGIC: Intact, pulses palpable  PSYCHIATRIC: Advanced cognitive impairment.             Labs:    Recent Results (from the past 240 hour(s))   HM2(CBC w/o Differential)   Result Value Ref Range    WBC 13.5 (H) 4.0 - 11.0 thou/uL    RBC 2.94 (L) 3.80 - 5.40 mill/uL    Hemoglobin 9.0 (L) 12.0 - 16.0 g/dL    Hematocrit 29.6 (L) 35.0 - 47.0 %     (H) 80 - 100 fL    MCH 30.6 27.0 - 34.0 pg    MCHC 30.4 (L) 32.0 - 36.0 g/dL    RDW 14.0 11.0 - 14.5 %    Platelets 336 140 - 440 thou/uL    MPV 10.5 8.5 - 12.5 fL   Basic Metabolic Panel   Result Value Ref Range    Sodium 140 136 - 145 mmol/L    Potassium 4.9 3.5 - 5.0 mmol/L    Chloride 107 98 - 107 mmol/L    CO2 23 22 - 31 mmol/L    Anion Gap, Calculation 10 5 - 18 mmol/L    Glucose 135 (H) 70 - 125 mg/dL    Calcium 9.4 8.5 - 10.5 mg/dL    BUN 35 (H) 8 - 28 mg/dL    Creatinine 1.47 (H) 0.60 - 1.10 mg/dL    GFR MDRD Af Amer 40 (L) >60 mL/min/1.73m2    GFR MDRD Non Af Amer 33 (L) >60 mL/min/1.73m2   BNP(B-type Natriuretic Peptide)   Result Value Ref Range     (H) 0 - 167 pg/mL         Assessment/Plan:  1. Closed fracture of right hip with routine healing     2. History of right hip hemiarthroplasty     3. Chronic systolic heart failure     4. Vascular dementia without behavioral disturbance     5. Diabetes mellitus type 2 in obese     6. Paroxysmal atrial fibrillation     7. Benign essential hypertension     8. CRD (chronic renal disease), stage 3 (moderate)     9. Anemia     10. Dysphagia     11. Lesion of lung     12. CAD (coronary artery disease)       Patient with  recent patient status post right hemiarthroplasty of the hip secondary to fall fracture.  Pain well-controlled Tylenol.  Underlying CHF.  Continues with crackles in bilateral lower lobe.  She is up 2 pounds.  I did dose her with additional Bumex last week.  No pedal edema.  Will give additional dose today. Underlying chronic kidney disease stage III.  Baseline creatinine 1.47.  Recent . Diabetes diet controlled.  Blood pressure improved with additional Bumex.  Coronary artery disease.  No chest pain.  She does continue on aspirin for DVT prophylaxis.  Also on Plavix.  Anemia.  Hemoglobin 9.  Recent drop in hemoglobin.  Guaiacs negative.  She does continue on PPI.  Patient with underlying advanced dementia.  Very poor historian.  History of dysphagia.  Continues in speech therapy.        Electronically signed by: Tena Talavera CNP

## 2021-06-16 PROBLEM — Z96.641 HISTORY OF RIGHT HIP HEMIARTHROPLASTY: Status: ACTIVE | Noted: 2017-09-26

## 2021-06-16 PROBLEM — N17.9 AKI (ACUTE KIDNEY INJURY) (H): Status: ACTIVE | Noted: 2017-12-27

## 2021-06-16 PROBLEM — S72.001D CLOSED FRACTURE OF RIGHT HIP WITH ROUTINE HEALING: Status: ACTIVE | Noted: 2017-09-11

## 2021-06-16 PROBLEM — G93.41 ACUTE METABOLIC ENCEPHALOPATHY: Status: ACTIVE | Noted: 2017-12-27

## 2021-06-16 PROBLEM — Z93.6 S/P ILEAL CONDUIT (H): Status: ACTIVE | Noted: 2017-12-27

## 2021-06-16 PROBLEM — N39.0 UTI (URINARY TRACT INFECTION): Status: ACTIVE | Noted: 2017-12-26
